# Patient Record
Sex: FEMALE | Race: WHITE
[De-identification: names, ages, dates, MRNs, and addresses within clinical notes are randomized per-mention and may not be internally consistent; named-entity substitution may affect disease eponyms.]

---

## 2022-02-24 ENCOUNTER — HOSPITAL ENCOUNTER (OUTPATIENT)
Dept: HOSPITAL 79 - KOH-I | Age: 67
End: 2022-02-24
Payer: MEDICARE

## 2022-02-24 DIAGNOSIS — M54.6: Primary | ICD-10-CM

## 2022-02-24 DIAGNOSIS — M47.814: ICD-10-CM

## 2022-04-25 ENCOUNTER — HOSPITAL ENCOUNTER (OUTPATIENT)
Dept: HOSPITAL 79 - EXRD | Age: 67
End: 2022-04-25
Attending: INTERNAL MEDICINE
Payer: MEDICARE

## 2022-04-25 DIAGNOSIS — R79.89: Primary | ICD-10-CM

## 2022-04-29 ENCOUNTER — OFFICE VISIT (OUTPATIENT)
Dept: CARDIOLOGY | Facility: CLINIC | Age: 67
End: 2022-04-29

## 2022-04-29 DIAGNOSIS — I73.9 PERIPHERAL ARTERY DISEASE: Primary | ICD-10-CM

## 2022-04-29 PROCEDURE — 99204 OFFICE O/P NEW MOD 45 MIN: CPT | Performed by: INTERNAL MEDICINE

## 2022-04-29 RX ORDER — LEVOTHYROXINE SODIUM 112 UG/1
TABLET ORAL
COMMUNITY
Start: 2022-03-22

## 2022-04-29 RX ORDER — GABAPENTIN 300 MG/1
CAPSULE ORAL EVERY 8 HOURS SCHEDULED
COMMUNITY

## 2022-04-29 RX ORDER — CLOPIDOGREL BISULFATE 75 MG/1
TABLET ORAL
COMMUNITY
Start: 2022-03-22

## 2022-04-29 RX ORDER — LISINOPRIL 20 MG/1
TABLET ORAL
COMMUNITY

## 2022-04-29 RX ORDER — SITAGLIPTIN 100 MG/1
TABLET, FILM COATED ORAL
COMMUNITY
Start: 2022-03-10

## 2022-04-29 RX ORDER — DULAGLUTIDE 3 MG/.5ML
INJECTION, SOLUTION SUBCUTANEOUS
COMMUNITY
Start: 2022-04-28

## 2022-04-29 RX ORDER — HYDROCODONE BITARTRATE AND ACETAMINOPHEN 5; 325 MG/1; MG/1
TABLET ORAL EVERY 12 HOURS SCHEDULED
COMMUNITY

## 2022-04-29 RX ORDER — AMITRIPTYLINE HYDROCHLORIDE 10 MG/1
TABLET, FILM COATED ORAL
COMMUNITY
Start: 2022-04-28

## 2022-04-29 RX ORDER — HYDROXYZINE HYDROCHLORIDE 25 MG/1
TABLET, FILM COATED ORAL
COMMUNITY
Start: 2022-03-10

## 2022-04-29 RX ORDER — SIMVASTATIN 20 MG
TABLET ORAL
COMMUNITY

## 2022-04-29 RX ORDER — CLOPIDOGREL BISULFATE 75 MG/1
TABLET ORAL
COMMUNITY

## 2022-04-30 NOTE — PROGRESS NOTES
[facility] CARDIOLOGY  [address] [place] [address]  Phone: [phone]  Fax: [phone]    04/29/2022    Chief Complaint   Patient presents with   • Leg Pain     PATIENT STATES LEG PAIN, FATIGUE , SHORT OF BREATH , BRUISES EASILY         History:   Sydnie Mendoza is a 66 y.o. female seen in consultation, self referred  for Pain in both legs but particularly left leg has given her a lot of pain with minimal walking. She has moved from [place] and has hx of aorto-iliac stent in [facility]. She also c/o dyspnea on exertion but denies chest pain.       Past Medical History:   Diagnosis Date   • COPD (chronic obstructive pulmonary disease) (HCC)    • Diabetes mellitus (HCC)    • Hyperlipidemia    • Hypertension        Past Surgical History:   Procedure Laterality Date   • CORONARY STENT PLACEMENT          Review of Systems:  Please see HPI  Constitution: No chills, no rigors, no unexplained weight loss or weight gain  Eyes:  No diplopia, no blurred vision, no loss of vision, conjunctiva is pink and sclera is anicteric  ENT:  No tinnitus, no otorrhea, no epistaxis, no sore throat   Respiratory: No cough, no hemoptysis  Cardiovascular: see HPI  Gastrointestinal: No nausea, no vomiting, no hematemesis, no diarrhea or constipation, no melena  Genitourinary: No frequency of dysuria no hematuria  Integument: No pruritis and  no skin rash  Hematologic / Lymphatic: No excessive bleeding, easy bruising, fatigue, lymphadenopathy and petechiae  Musculoskeletal: No joint pain, joint stiffness, joint swelling, muscle pain, muscle weakness and neck pain  Neurological: No dizziness, headaches, light headedness, seizures and vertigo  Endocrine: No frequent urination and nocturia, temperature intolerance, weight gain, unintended and weight loss, unintended        Past Social History:  Social History     Socioeconomic History   • Marital status:    Tobacco Use   • Smoking status: Light  Tobacco Smoker     Packs/day: 0.25   Vaping Use   • Vaping Use: Never used   Substance and Sexual Activity   • Alcohol use: Defer   • Drug use: Never   • Sexual activity: Defer       Past Family History:  History reviewed. No pertinent family history.    Current Outpatient Medications   Medication Sig Dispense Refill   • amitriptyline (ELAVIL) 10 MG tablet      • clopidogrel (PLAVIX) 75 MG tablet TAKE 1 TABLET EVERYDAY TO INHIBIT CLOTTING.     • gabapentin (NEURONTIN) 300 MG capsule Every 8 (Eight) Hours.     • HYDROcodone-acetaminophen (NORCO) 5-325 MG per tablet Every 12 (Twelve) Hours.     • hydrOXYzine (ATARAX) 25 MG tablet TAKE ONE TABLET BY MOUTH TWO TIMES A DAY FOR ANXIETY     • Januvia 100 MG tablet TAKE ONE TABLET BY MOUTH EVERY DAY TO LOWER BLOOD SUGAR     • levothyroxine (SYNTHROID, LEVOTHROID) 112 MCG tablet TAKE ONE TABLET BY MOUTH EVERY MORNING ON AN EMPTY STOMACH FOR THYROID     • lisinopril (PRINIVIL,ZESTRIL) 20 MG tablet lisinopril 20 mg tablet   TAKE ONE TABLET BY MOUTH EVERY DAY     • simvastatin (ZOCOR) 20 MG tablet simvastatin 20 mg tablet   TAKE 1 TABLET EVERY EVENING TO LOWER CHOLESTEROL AND TRIGLYCERIDES     • Trulicity 3 MG/0.5ML solution pen-injector      • clopidogrel (PLAVIX) 75 MG tablet clopidogrel 75 mg tablet   TAKE 1 TABLET EVERYDAY TO INHIBIT CLOTTING.       No current facility-administered medications for this visit.        No Known Allergies    Objective:  There were no vitals filed for this visit.      Comfortable NAD  PERRL, conjunctiva clear  Neck supple, no JVD or thyromegaly appreciated  S1/S2 RRR, no m/r/g  Lungs CTA B, normal effort  Abdomen S/NT/ND (+) BS, no HSM appreciated  Extremities warm, no clubbing, cyanosis, or edema  Normal gait  No visible or palpable skin lesions  A/Ox4, mood and affect appropriate  Pulse exam:   Feet are warm bilateral  No edema bilateral  Capillary refill is normal  No evidence of ulceration or color change of the toes   PULSES  Right DP and  PT are 1+ and Left DP and PT are 0    DATA:           Procedures     A/P:  Known severe PAD and hx of aorto-iliac stenting in [place] with return of symptom in the left leg  No palpable pulse in left leg  Known CAD s/p coronary stenting in [place]    Plan  Obtain records from [place]  Get Arterial doppler study left leg.  FU in 4 weeks    BMI has not been calculated during today's encounter.          ICD-10-CM ICD-9-CM   1. Peripheral artery disease (HCC)  I73.9 443.9        Thank you for allowing me to participate in the care of Sydnie Mendoza. Feel free to contact me directly with any further questions or concerns.        Director, Cardiac Cath Lab

## 2022-05-17 ENCOUNTER — HOSPITAL ENCOUNTER (OUTPATIENT)
Dept: HOSPITAL 79 - HEART 5 | Age: 67
End: 2022-05-17
Attending: INTERNAL MEDICINE
Payer: MEDICARE

## 2022-05-17 DIAGNOSIS — I11.9: ICD-10-CM

## 2022-05-17 DIAGNOSIS — I08.3: ICD-10-CM

## 2022-05-17 DIAGNOSIS — I25.10: Primary | ICD-10-CM

## 2022-05-17 DIAGNOSIS — I73.9: ICD-10-CM

## 2022-05-24 ENCOUNTER — HOSPITAL ENCOUNTER (OUTPATIENT)
Dept: ULTRASOUND IMAGING | Facility: HOSPITAL | Age: 67
Discharge: HOME | End: 2022-05-24
Admitting: INTERNAL MEDICINE

## 2022-05-24 DIAGNOSIS — I73.9 PERIPHERAL ARTERY DISEASE: ICD-10-CM

## 2022-05-24 PROCEDURE — 93926 LOWER EXTREMITY STUDY: CPT

## 2022-05-24 PROCEDURE — 93926 LOWER EXTREMITY STUDY: CPT | Performed by: RADIOLOGY

## 2022-05-27 ENCOUNTER — APPOINTMENT (OUTPATIENT)
Dept: CARDIOLOGY | Facility: HOSPITAL | Age: 67
End: 2022-05-27

## 2022-07-01 ENCOUNTER — HOSPITAL ENCOUNTER (OUTPATIENT)
Dept: HOSPITAL 79 - CT | Age: 67
End: 2022-07-01
Attending: NURSE PRACTITIONER
Payer: MEDICARE

## 2022-07-01 DIAGNOSIS — N28.1: Primary | ICD-10-CM

## 2022-08-01 ENCOUNTER — HOSPITAL ENCOUNTER (OUTPATIENT)
Dept: HOSPITAL 79 - MRI | Age: 67
End: 2022-08-01
Attending: NURSE PRACTITIONER
Payer: MEDICARE

## 2022-08-01 DIAGNOSIS — N28.89: Primary | ICD-10-CM

## 2022-08-01 PROCEDURE — A9577 INJ MULTIHANCE: HCPCS

## 2022-08-05 ENCOUNTER — OFFICE VISIT (OUTPATIENT)
Dept: CARDIOLOGY | Facility: CLINIC | Age: 67
End: 2022-08-05

## 2022-08-05 VITALS
HEIGHT: 64 IN | HEART RATE: 97 BPM | SYSTOLIC BLOOD PRESSURE: 109 MMHG | DIASTOLIC BLOOD PRESSURE: 71 MMHG | WEIGHT: 143.8 LBS | BODY MASS INDEX: 24.55 KG/M2

## 2022-08-05 DIAGNOSIS — I73.9 PERIPHERAL ARTERY DISEASE: Primary | ICD-10-CM

## 2022-08-05 PROCEDURE — 99214 OFFICE O/P EST MOD 30 MIN: CPT | Performed by: INTERNAL MEDICINE

## 2022-08-05 NOTE — PROGRESS NOTES
[facility] CARDIOLOGY  [address] [place] [address]  Phone: [phone]  Fax: [phone]    08/05/2022    Chief Complaint   Patient presents with   • Leg Pain     Follow up         History:   Sydnie Mendoza is a 66 y.o. female seen in followup,  She reports no pain in her legs but has noted off balance walking. She has seen a local cardiologist for her coronary stent and was told that it was alright. She can walk without stopping or pain but seems to feel legs are weak. She denies dizzy spells or palpitation.    Past Medical History:   Diagnosis Date   • COPD (chronic obstructive pulmonary disease) (HCC)    • Diabetes mellitus (HCC)    • Hyperlipidemia    • Hypertension        Past Surgical History:   Procedure Laterality Date   • CORONARY STENT PLACEMENT          Past Social History:  Social History     Socioeconomic History   • Marital status:    Tobacco Use   • Smoking status: Light Tobacco Smoker     Packs/day: 0.25   Vaping Use   • Vaping Use: Never used   Substance and Sexual Activity   • Alcohol use: Defer   • Drug use: Never   • Sexual activity: Defer       Past Family History:  History reviewed. No pertinent family history.    Review of Systems:   Please see HPI  Constitution: No chills, no rigors, no unexplained weight loss or weight gain  Eyes:  No diplopia, no blurred vision, no loss of vision, conjunctiva is pink and sclera is anicteric  ENT:  No tinnitus, no otorrhea, no epistaxis, no sore throat   Respiratory: No cough, no hemoptysis  Cardiovascular: see HPI  Gastrointestinal: No nausea, no vomiting, no hematemesis, no diarrhea or constipation, no melena  Genitourinary: No frequency of dysuria no hematuria  Integument: No pruritis and  no skin rash  Hematologic / Lymphatic: No excessive bleeding, easy bruising, fatigue, lymphadenopathy and petechiae  Musculoskeletal: No joint pain, joint stiffness, joint swelling, muscle pain, muscle weakness and neck  pain  Neurological: No dizziness, headaches, light headedness, seizures and vertigo  Endocrine: No frequent urination and nocturia, temperature intolerance, weight gain, unintended and weight loss, unintended      Current Outpatient Medications   Medication Sig Dispense Refill   • amitriptyline (ELAVIL) 10 MG tablet      • clopidogrel (PLAVIX) 75 MG tablet TAKE 1 TABLET EVERYDAY TO INHIBIT CLOTTING.     • clopidogrel (PLAVIX) 75 MG tablet clopidogrel 75 mg tablet   TAKE 1 TABLET EVERYDAY TO INHIBIT CLOTTING.     • gabapentin (NEURONTIN) 300 MG capsule Every 8 (Eight) Hours.     • HYDROcodone-acetaminophen (NORCO) 5-325 MG per tablet Every 12 (Twelve) Hours.     • hydrOXYzine (ATARAX) 25 MG tablet TAKE ONE TABLET BY MOUTH TWO TIMES A DAY FOR ANXIETY     • Januvia 100 MG tablet TAKE ONE TABLET BY MOUTH EVERY DAY TO LOWER BLOOD SUGAR     • levothyroxine (SYNTHROID, LEVOTHROID) 112 MCG tablet TAKE ONE TABLET BY MOUTH EVERY MORNING ON AN EMPTY STOMACH FOR THYROID     • lisinopril (PRINIVIL,ZESTRIL) 20 MG tablet lisinopril 20 mg tablet   TAKE ONE TABLET BY MOUTH EVERY DAY     • simvastatin (ZOCOR) 20 MG tablet simvastatin 20 mg tablet   TAKE 1 TABLET EVERY EVENING TO LOWER CHOLESTEROL AND TRIGLYCERIDES     • Trulicity 3 MG/0.5ML solution pen-injector        No current facility-administered medications for this visit.        No Known Allergies    Objective:  Vitals:    08/05/22 1015   BP: 109/71   Pulse: 97     Comfortable NAD  PERRL, conjunctiva clear  Neck supple, no JVD or thyromegaly appreciated  S1/S2 RRR, no m/r/g  NO CAROTID BRUIT  Lungs CTA B, normal effort  Abdomen S/NT/ND (+) BS, no HSM appreciated  Extremities warm, no clubbing, cyanosis, or edema  Normal gait  No visible or palpable skin lesions  A/Ox4, mood and affect appropriate  Pulse exam:    Feet are warm bilateral  No edema bilateral  Capillary refill is normal  No evidence of ulceration or color change of the toes  PULSES  Right DP and PT are 2+ and Left  DP and PT are 0+    DATA:        Procedures       A/P:  Arterial doppler of left extremity shows biphasic flow with no clear occlusive segment BUT NO PULSE ON LEFT  Hx of left SFA stent  Gait disorder with weakness of uncertain etiology. Hx of lumbar disease  Hx of coronary stent and stable CAD    Plan  Advised to review with PCP on issue of leg weakness  FU in 6 months. No procedure at this time      BMI is within normal parameters. No other follow-up for BMI required.       No diagnosis found.     Thank you for allowing me to participate in the care of Sydnie Mendoza. Feel free to contact me directly with any further questions or concerns.        Director, Cardiac Cath Lab

## 2022-08-23 ENCOUNTER — HOSPITAL ENCOUNTER (OUTPATIENT)
Dept: HOSPITAL 79 - CATH | Age: 67
End: 2022-08-23
Attending: INTERNAL MEDICINE
Payer: MEDICARE

## 2022-08-23 DIAGNOSIS — N18.9: ICD-10-CM

## 2022-08-23 DIAGNOSIS — J44.9: ICD-10-CM

## 2022-08-23 DIAGNOSIS — I12.9: ICD-10-CM

## 2022-08-23 DIAGNOSIS — Z95.5: ICD-10-CM

## 2022-08-23 DIAGNOSIS — E11.22: ICD-10-CM

## 2022-08-23 DIAGNOSIS — Z79.02: ICD-10-CM

## 2022-08-23 DIAGNOSIS — I25.82: ICD-10-CM

## 2022-08-23 DIAGNOSIS — E78.5: ICD-10-CM

## 2022-08-23 DIAGNOSIS — T82.855A: Primary | ICD-10-CM

## 2022-08-23 DIAGNOSIS — Z79.899: ICD-10-CM

## 2022-08-23 LAB
BUN/CREATININE RATIO: 15 (ref 0–10)
HGB BLD-MCNC: 14.8 GM/DL (ref 12.3–15.3)
RED BLOOD COUNT: 4.43 M/UL (ref 4–5.1)
WHITE BLOOD COUNT: 8.5 K/UL (ref 4.5–11)

## 2022-08-23 PROCEDURE — C1894 INTRO/SHEATH, NON-LASER: HCPCS

## 2022-08-23 PROCEDURE — C1887 CATHETER, GUIDING: HCPCS

## 2022-08-23 PROCEDURE — C1769 GUIDE WIRE: HCPCS

## 2022-08-26 ENCOUNTER — HOSPITAL ENCOUNTER (EMERGENCY)
Dept: HOSPITAL 79 - ER1 | Age: 67
Discharge: HOME | End: 2022-08-26
Payer: MEDICARE

## 2022-08-26 DIAGNOSIS — E78.5: ICD-10-CM

## 2022-08-26 DIAGNOSIS — Z79.02: ICD-10-CM

## 2022-08-26 DIAGNOSIS — F17.210: ICD-10-CM

## 2022-08-26 DIAGNOSIS — I25.2: ICD-10-CM

## 2022-08-26 DIAGNOSIS — I11.9: ICD-10-CM

## 2022-08-26 DIAGNOSIS — R07.89: Primary | ICD-10-CM

## 2022-08-26 DIAGNOSIS — E11.9: ICD-10-CM

## 2022-08-26 LAB
BUN/CREATININE RATIO: 15 (ref 0–10)
HGB BLD-MCNC: 15.4 GM/DL (ref 12.3–15.3)
RED BLOOD COUNT: 4.56 M/UL (ref 4–5.1)
WHITE BLOOD COUNT: 11 K/UL (ref 4.5–11)

## 2023-01-04 ENCOUNTER — TRANSCRIBE ORDERS (OUTPATIENT)
Dept: LAB | Facility: HOSPITAL | Age: 68
End: 2023-01-04
Payer: MEDICARE

## 2023-01-04 DIAGNOSIS — R22.1 NECK MASS: Primary | ICD-10-CM

## 2023-01-10 ENCOUNTER — HOSPITAL ENCOUNTER (OUTPATIENT)
Dept: ULTRASOUND IMAGING | Facility: HOSPITAL | Age: 68
Discharge: HOME | End: 2023-01-10
Admitting: NURSE PRACTITIONER
Payer: MEDICARE

## 2023-01-10 DIAGNOSIS — R22.1 NECK MASS: ICD-10-CM

## 2023-01-10 PROCEDURE — 76536 US EXAM OF HEAD AND NECK: CPT | Performed by: RADIOLOGY

## 2023-01-10 PROCEDURE — 76536 US EXAM OF HEAD AND NECK: CPT

## 2023-03-22 ENCOUNTER — TRANSCRIBE ORDERS (OUTPATIENT)
Dept: LAB | Facility: HOSPITAL | Age: 68
End: 2023-03-22
Payer: MEDICARE

## 2023-03-22 ENCOUNTER — HOSPITAL ENCOUNTER (OUTPATIENT)
Dept: ULTRASOUND IMAGING | Facility: HOSPITAL | Age: 68
Discharge: HOME | End: 2023-03-22
Admitting: NURSE PRACTITIONER
Payer: MEDICARE

## 2023-03-22 DIAGNOSIS — R10.11 RUQ PAIN: ICD-10-CM

## 2023-03-22 DIAGNOSIS — R10.11 RUQ PAIN: Primary | ICD-10-CM

## 2023-03-22 PROCEDURE — 76700 US EXAM ABDOM COMPLETE: CPT | Performed by: RADIOLOGY

## 2023-03-22 PROCEDURE — 76700 US EXAM ABDOM COMPLETE: CPT

## 2023-08-18 ENCOUNTER — HOSPITAL ENCOUNTER (OUTPATIENT)
Dept: GENERAL RADIOLOGY | Facility: HOSPITAL | Age: 68
Discharge: HOME | End: 2023-08-18
Admitting: NURSE PRACTITIONER
Payer: MEDICARE

## 2023-08-18 ENCOUNTER — TRANSCRIBE ORDERS (OUTPATIENT)
Dept: LAB | Facility: HOSPITAL | Age: 68
End: 2023-08-18
Payer: MEDICARE

## 2023-08-18 DIAGNOSIS — M54.2 CERVICALGIA: ICD-10-CM

## 2023-08-18 DIAGNOSIS — M54.2 CERVICALGIA: Primary | ICD-10-CM

## 2023-08-18 PROCEDURE — 72040 X-RAY EXAM NECK SPINE 2-3 VW: CPT

## 2023-08-29 ENCOUNTER — TRANSCRIBE ORDERS (OUTPATIENT)
Dept: ADMINISTRATIVE | Facility: HOSPITAL | Age: 68
End: 2023-08-29
Payer: MEDICARE

## 2023-08-29 DIAGNOSIS — M79.641 PAIN IN RIGHT HAND: Primary | ICD-10-CM

## 2023-09-26 ENCOUNTER — HOSPITAL ENCOUNTER (OUTPATIENT)
Dept: GENERAL RADIOLOGY | Facility: HOSPITAL | Age: 68
Discharge: HOME | End: 2023-09-26
Payer: MEDICARE

## 2023-09-26 ENCOUNTER — TRANSCRIBE ORDERS (OUTPATIENT)
Dept: LAB | Facility: HOSPITAL | Age: 68
End: 2023-09-26
Payer: MEDICARE

## 2023-09-26 DIAGNOSIS — M79.642 PAIN IN LEFT HAND: Primary | ICD-10-CM

## 2023-09-26 DIAGNOSIS — M79.642 PAIN IN LEFT HAND: ICD-10-CM

## 2023-09-26 DIAGNOSIS — R05.1 ACUTE COUGH: ICD-10-CM

## 2023-09-26 DIAGNOSIS — M25.532 PAIN IN LEFT WRIST: ICD-10-CM

## 2023-09-26 DIAGNOSIS — M25.531 PAIN IN RIGHT WRIST: ICD-10-CM

## 2023-09-26 PROCEDURE — 71046 X-RAY EXAM CHEST 2 VIEWS: CPT

## 2023-09-26 PROCEDURE — 73130 X-RAY EXAM OF HAND: CPT

## 2023-09-26 PROCEDURE — 73110 X-RAY EXAM OF WRIST: CPT

## 2023-10-02 ENCOUNTER — TRANSCRIBE ORDERS (OUTPATIENT)
Dept: LAB | Facility: HOSPITAL | Age: 68
End: 2023-10-02
Payer: MEDICARE

## 2023-10-02 DIAGNOSIS — I73.9 PERIPHERAL VASCULAR DISEASE, UNSPECIFIED: Primary | ICD-10-CM

## 2023-10-02 DIAGNOSIS — M79.662 BILATERAL CALF PAIN: ICD-10-CM

## 2023-10-02 DIAGNOSIS — M79.661 BILATERAL CALF PAIN: ICD-10-CM

## 2023-10-03 ENCOUNTER — TRANSCRIBE ORDERS (OUTPATIENT)
Dept: ADMINISTRATIVE | Facility: HOSPITAL | Age: 68
End: 2023-10-03
Payer: MEDICARE